# Patient Record
Sex: MALE | Race: WHITE
[De-identification: names, ages, dates, MRNs, and addresses within clinical notes are randomized per-mention and may not be internally consistent; named-entity substitution may affect disease eponyms.]

---
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
## 2019-10-15 ENCOUNTER — HOSPITAL ENCOUNTER (EMERGENCY)
Dept: HOSPITAL 54 - ER | Age: 59
Discharge: HOME | End: 2019-10-15
Payer: MEDICAID

## 2019-10-15 VITALS — DIASTOLIC BLOOD PRESSURE: 77 MMHG | SYSTOLIC BLOOD PRESSURE: 118 MMHG

## 2019-10-15 VITALS — WEIGHT: 140 LBS | HEIGHT: 65 IN | BODY MASS INDEX: 23.32 KG/M2
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
## 2019-10-15 DIAGNOSIS — S43.101A: ICD-10-CM
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
## 2019-10-15 DIAGNOSIS — S00.81XA: ICD-10-CM
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
## 2019-10-15 DIAGNOSIS — S02.2XXA: Primary | ICD-10-CM
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
